# Patient Record
Sex: MALE
[De-identification: names, ages, dates, MRNs, and addresses within clinical notes are randomized per-mention and may not be internally consistent; named-entity substitution may affect disease eponyms.]

---

## 2024-06-28 ENCOUNTER — NON-APPOINTMENT (OUTPATIENT)
Age: 29
End: 2024-06-28

## 2024-06-28 DIAGNOSIS — M72.9 FIBROBLASTIC DISORDER, UNSPECIFIED: ICD-10-CM

## 2024-06-28 PROBLEM — Z00.00 ENCOUNTER FOR PREVENTIVE HEALTH EXAMINATION: Status: ACTIVE | Noted: 2024-06-28

## 2024-09-19 ENCOUNTER — APPOINTMENT (OUTPATIENT)
Dept: PODIATRY | Facility: CLINIC | Age: 29
End: 2024-09-19
Payer: COMMERCIAL

## 2024-09-19 DIAGNOSIS — M72.9 FIBROBLASTIC DISORDER, UNSPECIFIED: ICD-10-CM

## 2024-09-19 DIAGNOSIS — Z82.49 FAMILY HISTORY OF ISCHEMIC HEART DISEASE AND OTHER DISEASES OF THE CIRCULATORY SYSTEM: ICD-10-CM

## 2024-09-19 DIAGNOSIS — Z83.3 FAMILY HISTORY OF DIABETES MELLITUS: ICD-10-CM

## 2024-09-19 DIAGNOSIS — F32.A DEPRESSION, UNSPECIFIED: ICD-10-CM

## 2024-09-19 DIAGNOSIS — F41.9 ANXIETY DISORDER, UNSPECIFIED: ICD-10-CM

## 2024-09-19 PROCEDURE — 99203 OFFICE O/P NEW LOW 30 MIN: CPT

## 2024-09-19 NOTE — PROCEDURE
[FreeTextEntry1] : Plan:  Examination.  ([id number]).  We had a lengthy discussion concerning the patient's condition.  We offered him an NSAID which he refused.  He was biomechanically evaluated and electronically scanned for custom foot orthosis. Patient to return: 2 weeks.

## 2024-09-19 NOTE — PHYSICAL EXAM
[FreeTextEntry3] :  Vascular Exam: DP 2/4 bilaterally, PT 2/4 bilaterally, Temperature gradient normal, Capillary return instant x 10 [de-identified] : Orthopedic Exam: Patient presents with a varus foot type.  He has some discomfort upon palpation in the plantar right heel.  No edema, erythema or ecchymosis noted.  His current orthosis no longer accommodates his condition. X-rays: deferred at this time. [FreeTextEntry1] : Neurological Exam: Sharp / Dull - L: WNL. Sharp / Dull - R: WNL. Light Touch/pressure - L: WNL. Light Touch/pressure - R: WNL. Hot/cold - L: WNL. Hot/cold - R: WNL. Vibratory - L: WNL. Vibratory - R: WNL.

## 2024-09-19 NOTE — HISTORY OF PRESENT ILLNESS
[FreeTextEntry1] : Manjeet is a 29-year-old male who presents to our office complaining his fasciitis is starting to act up.  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  He noticed his orthosis are quite worn.  He is requesting a new pair.

## 2024-09-20 PROBLEM — Z83.3 FAMILY HISTORY OF DIABETES MELLITUS: Status: ACTIVE | Noted: 2024-09-20

## 2024-09-20 PROBLEM — F32.A DEPRESSION: Status: ACTIVE | Noted: 2024-09-20

## 2024-09-20 PROBLEM — F41.9 ANXIETY: Status: ACTIVE | Noted: 2024-09-20

## 2024-09-20 PROBLEM — Z82.49 FAMILY HISTORY OF HYPERTENSION: Status: ACTIVE | Noted: 2024-09-20

## 2024-09-20 RX ORDER — BUPROPION HYDROCHLORIDE 150 MG/1
150 TABLET, EXTENDED RELEASE ORAL
Refills: 0 | Status: ACTIVE | COMMUNITY

## 2024-09-20 RX ORDER — GUSELKUMAB 100 MG/ML
100 INJECTION SUBCUTANEOUS
Refills: 0 | Status: ACTIVE | COMMUNITY

## 2024-10-09 ENCOUNTER — APPOINTMENT (OUTPATIENT)
Dept: PODIATRY | Facility: CLINIC | Age: 29
End: 2024-10-09
Payer: COMMERCIAL

## 2024-10-09 DIAGNOSIS — M72.9 FIBROBLASTIC DISORDER, UNSPECIFIED: ICD-10-CM

## 2024-10-09 PROCEDURE — 99213 OFFICE O/P EST LOW 20 MIN: CPT
